# Patient Record
Sex: FEMALE | Race: WHITE | Employment: FULL TIME | URBAN - METROPOLITAN AREA
[De-identification: names, ages, dates, MRNs, and addresses within clinical notes are randomized per-mention and may not be internally consistent; named-entity substitution may affect disease eponyms.]

---

## 2024-01-03 ENCOUNTER — HOSPITAL ENCOUNTER (OUTPATIENT)
Age: 54
Discharge: HOME | End: 2024-01-03

## 2024-01-03 PROCEDURE — 86480 TB TEST CELL IMMUN MEASURE: CPT

## 2024-01-03 PROCEDURE — 80074 ACUTE HEPATITIS PANEL: CPT

## 2024-01-03 PROCEDURE — 85025 COMPLETE CBC W/AUTO DIFF WBC: CPT

## 2024-01-03 PROCEDURE — 80076 HEPATIC FUNCTION PANEL: CPT

## 2024-01-04 LAB
ALBUMIN SERPL-MCNC: 4.3 G/DL (ref 3.4–5)
ALP SERPL-CCNC: 89 U/L (ref 40–129)
ALT SERPL-CCNC: 26 U/L (ref 10–40)
AST SERPL-CCNC: 24 U/L (ref 15–37)
BASOPHILS # BLD: 0 K/UL (ref 0–0.2)
BASOPHILS NFR BLD: 0.6 %
BILIRUB DIRECT SERPL-MCNC: <0.2 MG/DL (ref 0–0.3)
BILIRUB INDIRECT SERPL-MCNC: NORMAL MG/DL (ref 0–1)
BILIRUB SERPL-MCNC: 0.4 MG/DL (ref 0–1)
DEPRECATED RDW RBC AUTO: 14 % (ref 12.4–15.4)
EOSINOPHIL # BLD: 0.3 K/UL (ref 0–0.6)
EOSINOPHIL NFR BLD: 5 %
HAV IGM SERPL QL IA: NORMAL
HBV CORE IGM SERPL QL IA: NORMAL
HBV SURFACE AG SERPL QL IA: NORMAL
HCT VFR BLD AUTO: 38.1 % (ref 36–48)
HCV AB SERPL QL IA: NORMAL
HGB BLD-MCNC: 12.7 G/DL (ref 12–16)
LYMPHOCYTES # BLD: 2 K/UL (ref 1–5.1)
LYMPHOCYTES NFR BLD: 32.5 %
MCH RBC QN AUTO: 27.9 PG (ref 26–34)
MCHC RBC AUTO-ENTMCNC: 33.2 G/DL (ref 31–36)
MCV RBC AUTO: 83.9 FL (ref 80–100)
MONOCYTES # BLD: 0.6 K/UL (ref 0–1.3)
MONOCYTES NFR BLD: 10.2 %
NEUTROPHILS # BLD: 3.1 K/UL (ref 1.7–7.7)
NEUTROPHILS NFR BLD: 51.7 %
PLATELET # BLD AUTO: 328 K/UL (ref 135–450)
PMV BLD AUTO: 7.5 FL (ref 5–10.5)
PROT SERPL-MCNC: 7.3 G/DL (ref 6.4–8.2)
RBC # BLD AUTO: 4.55 M/UL (ref 4–5.2)
WBC # BLD AUTO: 6 K/UL (ref 4–11)

## 2024-01-06 LAB
GAMMA INTERFERON BACKGROUND BLD IA-ACNC: 0.02 IU/ML
MITOGEN IGNF BCKGRD COR BLD-ACNC: >10 IU/ML
QUANTI TB GOLD PLUS: NEGATIVE
QUANTI TB1 MINUS NIL: 0 IU/ML (ref 0–0.34)
QUANTI TB2 MINUS NIL: 0 IU/ML (ref 0–0.34)

## 2024-01-23 ENCOUNTER — APPOINTMENT (OUTPATIENT)
Dept: CT IMAGING | Age: 54
End: 2024-01-23
Payer: COMMERCIAL

## 2024-01-23 ENCOUNTER — HOSPITAL ENCOUNTER (EMERGENCY)
Age: 54
Discharge: HOME | End: 2024-01-23
Payer: COMMERCIAL

## 2024-01-23 ENCOUNTER — APPOINTMENT (OUTPATIENT)
Dept: GENERAL RADIOLOGY | Age: 54
End: 2024-01-23
Payer: COMMERCIAL

## 2024-01-23 VITALS
OXYGEN SATURATION: 99 % | HEIGHT: 61 IN | SYSTOLIC BLOOD PRESSURE: 128 MMHG | TEMPERATURE: 97 F | WEIGHT: 215 LBS | RESPIRATION RATE: 16 BRPM | HEART RATE: 88 BPM | DIASTOLIC BLOOD PRESSURE: 87 MMHG | BODY MASS INDEX: 40.59 KG/M2

## 2024-01-23 DIAGNOSIS — R10.13 ABDOMINAL PAIN, EPIGASTRIC: Primary | ICD-10-CM

## 2024-01-23 LAB
ALBUMIN SERPL-MCNC: 4 G/DL (ref 3.4–5)
ALBUMIN/GLOB SERPL: 1 {RATIO} (ref 1.1–2.2)
ALP SERPL-CCNC: 96 U/L (ref 40–129)
ALT SERPL-CCNC: 20 U/L (ref 10–40)
ANION GAP SERPL CALCULATED.3IONS-SCNC: 9 MMOL/L (ref 3–16)
AST SERPL-CCNC: 24 U/L (ref 15–37)
BASOPHILS # BLD: 0.1 K/UL (ref 0–0.2)
BASOPHILS NFR BLD: 0.8 %
BILIRUB SERPL-MCNC: 0.3 MG/DL (ref 0–1)
BUN SERPL-MCNC: 12 MG/DL (ref 7–20)
CALCIUM SERPL-MCNC: 9.3 MG/DL (ref 8.3–10.6)
CHLORIDE SERPL-SCNC: 98 MMOL/L (ref 99–110)
CO2 SERPL-SCNC: 26 MMOL/L (ref 21–32)
CREAT SERPL-MCNC: 0.8 MG/DL (ref 0.6–1.1)
DEPRECATED RDW RBC AUTO: 13.8 % (ref 12.4–15.4)
EKG ATRIAL RATE: 87 BPM
EKG DIAGNOSIS: NORMAL
EKG P AXIS: 25 DEGREES
EKG P-R INTERVAL: 162 MS
EKG Q-T INTERVAL: 372 MS
EKG QRS DURATION: 82 MS
EKG QTC CALCULATION (BAZETT): 447 MS
EKG R AXIS: -16 DEGREES
EKG T AXIS: 43 DEGREES
EKG VENTRICULAR RATE: 87 BPM
EOSINOPHIL # BLD: 0.3 K/UL (ref 0–0.6)
EOSINOPHIL NFR BLD: 2.3 %
GFR SERPLBLD CREATININE-BSD FMLA CKD-EPI: >60 ML/MIN/{1.73_M2}
GLUCOSE SERPL-MCNC: 147 MG/DL (ref 70–99)
HCT VFR BLD AUTO: 37.8 % (ref 36–48)
HGB BLD-MCNC: 12.8 G/DL (ref 12–16)
LYMPHOCYTES # BLD: 2.8 K/UL (ref 1–5.1)
LYMPHOCYTES NFR BLD: 23.6 %
MCH RBC QN AUTO: 28 PG (ref 26–34)
MCHC RBC AUTO-ENTMCNC: 33.7 G/DL (ref 31–36)
MCV RBC AUTO: 82.9 FL (ref 80–100)
MONOCYTES # BLD: 0.8 K/UL (ref 0–1.3)
MONOCYTES NFR BLD: 6.9 %
NEUTROPHILS # BLD: 8 K/UL (ref 1.7–7.7)
NEUTROPHILS NFR BLD: 66.4 %
NT-PROBNP SERPL-MCNC: <36 PG/ML (ref 0–124)
PLATELET # BLD AUTO: 316 K/UL (ref 135–450)
PMV BLD AUTO: 7.2 FL (ref 5–10.5)
POTASSIUM SERPL-SCNC: 4.1 MMOL/L (ref 3.5–5.1)
PROT SERPL-MCNC: 8 G/DL (ref 6.4–8.2)
RBC # BLD AUTO: 4.57 M/UL (ref 4–5.2)
SODIUM SERPL-SCNC: 133 MMOL/L (ref 136–145)
TROPONIN, HIGH SENSITIVITY: 6 NG/L (ref 0–14)
TROPONIN, HIGH SENSITIVITY: <6 NG/L (ref 0–14)
WBC # BLD AUTO: 12 K/UL (ref 4–11)

## 2024-01-23 PROCEDURE — 36415 COLL VENOUS BLD VENIPUNCTURE: CPT

## 2024-01-23 PROCEDURE — 93005 ELECTROCARDIOGRAM TRACING: CPT | Performed by: NURSE PRACTITIONER

## 2024-01-23 PROCEDURE — 83880 ASSAY OF NATRIURETIC PEPTIDE: CPT

## 2024-01-23 PROCEDURE — 85025 COMPLETE CBC W/AUTO DIFF WBC: CPT

## 2024-01-23 PROCEDURE — 93010 ELECTROCARDIOGRAM REPORT: CPT | Performed by: INTERNAL MEDICINE

## 2024-01-23 PROCEDURE — 6360000002 HC RX W HCPCS: Performed by: NURSE PRACTITIONER

## 2024-01-23 PROCEDURE — 6360000004 HC RX CONTRAST MEDICATION: Performed by: NURSE PRACTITIONER

## 2024-01-23 PROCEDURE — 84484 ASSAY OF TROPONIN QUANT: CPT

## 2024-01-23 PROCEDURE — 71260 CT THORAX DX C+: CPT

## 2024-01-23 PROCEDURE — 74177 CT ABD & PELVIS W/CONTRAST: CPT

## 2024-01-23 PROCEDURE — 80053 COMPREHEN METABOLIC PANEL: CPT

## 2024-01-23 PROCEDURE — 71046 X-RAY EXAM CHEST 2 VIEWS: CPT

## 2024-01-23 PROCEDURE — 99285 EMERGENCY DEPT VISIT HI MDM: CPT

## 2024-01-23 PROCEDURE — 96374 THER/PROPH/DIAG INJ IV PUSH: CPT

## 2024-01-23 RX ORDER — KETOROLAC TROMETHAMINE 15 MG/ML
15 INJECTION, SOLUTION INTRAMUSCULAR; INTRAVENOUS ONCE
Status: COMPLETED | OUTPATIENT
Start: 2024-01-23 | End: 2024-01-23

## 2024-01-23 RX ADMIN — IOPAMIDOL 75 ML: 755 INJECTION, SOLUTION INTRAVENOUS at 16:17

## 2024-01-23 RX ADMIN — KETOROLAC TROMETHAMINE 15 MG: 15 INJECTION, SOLUTION INTRAMUSCULAR; INTRAVENOUS at 18:01

## 2024-01-23 ASSESSMENT — PAIN SCALES - GENERAL: PAINLEVEL_OUTOF10: 8

## 2024-01-23 ASSESSMENT — PAIN - FUNCTIONAL ASSESSMENT: PAIN_FUNCTIONAL_ASSESSMENT: 0-10

## 2024-01-24 LAB
EKG ATRIAL RATE: 64 BPM
EKG DIAGNOSIS: NORMAL
EKG P AXIS: 32 DEGREES
EKG P-R INTERVAL: 186 MS
EKG Q-T INTERVAL: 424 MS
EKG QRS DURATION: 74 MS
EKG QTC CALCULATION (BAZETT): 437 MS
EKG R AXIS: -21 DEGREES
EKG T AXIS: 21 DEGREES
EKG VENTRICULAR RATE: 64 BPM

## 2024-01-24 ASSESSMENT — ENCOUNTER SYMPTOMS
SORE THROAT: 0
SHORTNESS OF BREATH: 1
EYE PAIN: 0
NAUSEA: 0
BACK PAIN: 0
VOMITING: 0
COUGH: 0
ABDOMINAL PAIN: 1
RHINORRHEA: 0

## 2024-01-24 NOTE — ED PROVIDER NOTES
01/23/24 1913   BP: 135/86 128/87   Pulse: 88 88   Resp: 18 16   Temp: 97 °F (36.1 °C)    TempSrc: Oral    SpO2: 99%    Weight: 97.5 kg (215 lb)    Height: 1.549 m (5' 1\")        Patient was given the following medications:  Medications   iopamidol (ISOVUE-370) 76 % injection 75 mL (75 mLs IntraVENous Given 1/23/24 1617)   ketorolac (TORADOL) injection 15 mg (15 mg IntraVENous Given 1/23/24 1801)       ED Course as of 01/24/24 1531   Tue Jan 23, 2024   1548 EKG obtained today in the emergency department shows a normal sinus rhythm with low voltage.  No ST segment lobation, ST segment depression, hyperacute T waves.  Ventricular rate 87.  QTc 447. VAISHNAVI GUZMAN DO   [TT]      ED Course User Index  [TT] Vaishnavi Guzman DO        Is this patient to be included in the SEP-1 Core Measure due to severe sepsis or septic shock?   No   Exclusion criteria - the patient is NOT to be included for SEP-1 Core Measure due to:  Infection is not suspected        Chronic Conditions affecting care:    has a past medical history of TIA (transient ischemic attack).    CONSULTS: (Who and What was discussed)  None      Records Reviewed (External and Source)     CC/HPI Summary, DDx, ED Course, and Reassessment: Patient looks well in the emergency department.  Plan for discharge and follow-up.  Her heart score is low risk at 2.  The patient's EKG x 2 was unremarkable without any evidence of ST segment depression or elevation.  No inverted T waves.  No evidence of ischemia.  Normal sinus rhythm Was noted which was unremarkable.  The patient's troponin x 2 was negative.  Vital signs are unremarkable.  Perform CT scan of her chest abdomen pelvis.  CT PE study was negative for any evidence of PE.  No focal infiltrates.  No pleural effusions.  The abdomen CT and pelvis was unremarkable for any evidence of acute inflammatory process or free fluid.  No acute concerns.  X-ray was also obtained prior to the studies which was unremarkable.  At